# Patient Record
Sex: FEMALE
[De-identification: names, ages, dates, MRNs, and addresses within clinical notes are randomized per-mention and may not be internally consistent; named-entity substitution may affect disease eponyms.]

---

## 2024-09-26 ENCOUNTER — APPOINTMENT (OUTPATIENT)
Dept: BARIATRICS | Facility: CLINIC | Age: 59
End: 2024-09-26
Payer: COMMERCIAL

## 2024-09-26 VITALS
OXYGEN SATURATION: 93 % | DIASTOLIC BLOOD PRESSURE: 91 MMHG | HEART RATE: 84 BPM | BODY MASS INDEX: 42.53 KG/M2 | HEIGHT: 62.5 IN | SYSTOLIC BLOOD PRESSURE: 139 MMHG | TEMPERATURE: 98.2 F | WEIGHT: 237.05 LBS

## 2024-09-26 DIAGNOSIS — I48.91 UNSPECIFIED ATRIAL FIBRILLATION: ICD-10-CM

## 2024-09-26 DIAGNOSIS — I10 ESSENTIAL (PRIMARY) HYPERTENSION: ICD-10-CM

## 2024-09-26 DIAGNOSIS — J45.909 UNSPECIFIED ASTHMA, UNCOMPLICATED: ICD-10-CM

## 2024-09-26 DIAGNOSIS — E66.01 MORBID (SEVERE) OBESITY DUE TO EXCESS CALORIES: ICD-10-CM

## 2024-09-26 PROBLEM — Z00.00 ENCOUNTER FOR PREVENTIVE HEALTH EXAMINATION: Status: ACTIVE | Noted: 2024-09-26

## 2024-09-26 PROCEDURE — 99204 OFFICE O/P NEW MOD 45 MIN: CPT

## 2024-09-26 NOTE — PLAN
[FreeTextEntry1] : Will encourage proper eating habits and identifying stressors/triggers for poor eating habits and implementing appropriate behavioral changes   SATHYA Evaluation - Vijaya   Preoperative Medical evaluations - Primary Care Physician, Pulmonologist, Cardiologist, Behavioral Health   F/U once comprehensive assessment complete and dietary counseling sufficient to plan for surgery   45 minutes total spent with patients discussing importance of weight loss and path to bariatric surgery.

## 2024-09-26 NOTE — ASSESSMENT
[FreeTextEntry1] : Patient is a 58 year y/o female  with a BMI of 42 and HTN, AFib, Asthma presents for bariatric surgery.  The different types of bariatric surgery were explained to her and the endoscopic sleeve gastroplasty was deemed the best option.  The patient wishes to pursue medical weight management with her Endocrinologist for now but will follow up with surgeon if decides to pursue ESG

## 2024-09-26 NOTE — HISTORY OF PRESENT ILLNESS
[de-identified] : Initial Bariatric Surgery Evaluation   Patient is a  58 year  y/o female with a BMI of 42 and the following obesity related comorbidities: asthma, Afib, HTN.  She has been struggling with weight for years and has failed multiple attempts at non-surgical weight loss options.  She has tried the following diets and weight loss options: Ozempic, Mounjaro.  She  presents for bariatric surgery evaluation.   Current Weight: 237 Highest Weight in the last 5 years: 237  14 years ago: 160  Symptoms of GERD: Denies Colonoscopy and Endoscopy - 2 years ago PSH: knee surgery (arhtroscopic)  Marital Status:  Children: son (30's), daughter (30's) Work: [employer] () HH: lives by herself   Exercise: none Dietary Habits:    Alcohol/Illicit Drugs/Tobacco: Occasional alcohol

## 2024-10-28 ENCOUNTER — APPOINTMENT (OUTPATIENT)
Dept: BARIATRICS | Facility: CLINIC | Age: 59
End: 2024-10-28